# Patient Record
Sex: MALE | Race: WHITE | NOT HISPANIC OR LATINO | URBAN - METROPOLITAN AREA
[De-identification: names, ages, dates, MRNs, and addresses within clinical notes are randomized per-mention and may not be internally consistent; named-entity substitution may affect disease eponyms.]

---

## 2023-02-17 ENCOUNTER — OFFICE (OUTPATIENT)
Dept: URBAN - METROPOLITAN AREA CLINIC 102 | Facility: CLINIC | Age: 53
End: 2023-02-17

## 2023-02-17 ENCOUNTER — OFFICE (OUTPATIENT)
Dept: URBAN - METROPOLITAN AREA CLINIC 102 | Facility: CLINIC | Age: 53
End: 2023-02-17
Payer: OTHER GOVERNMENT

## 2023-02-17 VITALS
SYSTOLIC BLOOD PRESSURE: 154 MMHG | DIASTOLIC BLOOD PRESSURE: 94 MMHG | HEIGHT: 76 IN | WEIGHT: 315 LBS | TEMPERATURE: 97.7 F | HEART RATE: 66 BPM

## 2023-02-17 DIAGNOSIS — K76.0 FATTY (CHANGE OF) LIVER, NOT ELSEWHERE CLASSIFIED: ICD-10-CM

## 2023-02-17 DIAGNOSIS — I10 ESSENTIAL (PRIMARY) HYPERTENSION: ICD-10-CM

## 2023-02-17 DIAGNOSIS — K57.32 DIVERTICULITIS OF LARGE INTESTINE WITHOUT PERFORATION OR ABS: ICD-10-CM

## 2023-02-17 DIAGNOSIS — G47.33 OBSTRUCTIVE SLEEP APNEA (ADULT) (PEDIATRIC): ICD-10-CM

## 2023-02-17 PROCEDURE — 00031: CPT | Performed by: INTERNAL MEDICINE

## 2023-02-17 PROCEDURE — 99203 OFFICE O/P NEW LOW 30 MIN: CPT | Performed by: PHYSICIAN ASSISTANT

## 2023-02-17 NOTE — SERVICEHPINOTES
Mr. Bedolla is a 52 YoM  and  with history of HTN presents for follow up on episode of diverticulitis in July of 2022 who presents to the office for ER follow-up. CT in July was significant for mild acute diverticulitis of sigmoid colon, +hepatic steatosis, spleen mildly enlarged. He was treated with antibiotics and discharged from the ER. He denies rectal bleeding at any point. Since then he denies recurrence of symptoms. BMs usually daily, soft/formed stools. Certain foods tend to cause loose stools. He takes fiber supplements occasionally. He reports [facility].brHad neck fusion in 2017 which went well. DEXTER diagnosed shortly after, started on CPAP. No cardiac history, antiplatelet or AC use. Grandmother with colon cancer. No other family Hx CRC, IBD, or celiac.

## 2023-04-10 ENCOUNTER — ON CAMPUS - OUTPATIENT (OUTPATIENT)
Dept: URBAN - METROPOLITAN AREA HOSPITAL 16 | Facility: HOSPITAL | Age: 53
End: 2023-04-10
Payer: OTHER GOVERNMENT

## 2023-04-10 DIAGNOSIS — Z12.11 ENCOUNTER FOR SCREENING FOR MALIGNANT NEOPLASM OF COLON: ICD-10-CM

## 2023-04-10 DIAGNOSIS — K63.5 POLYP OF COLON: ICD-10-CM

## 2023-04-10 PROCEDURE — 45385 COLONOSCOPY W/LESION REMOVAL: CPT | Mod: PT | Performed by: INTERNAL MEDICINE
